# Patient Record
Sex: MALE | Race: WHITE | NOT HISPANIC OR LATINO
[De-identification: names, ages, dates, MRNs, and addresses within clinical notes are randomized per-mention and may not be internally consistent; named-entity substitution may affect disease eponyms.]

---

## 2018-02-06 ENCOUNTER — TRANSCRIPTION ENCOUNTER (OUTPATIENT)
Age: 63
End: 2018-02-06

## 2018-02-09 ENCOUNTER — APPOINTMENT (OUTPATIENT)
Dept: ORTHOPEDIC SURGERY | Facility: CLINIC | Age: 63
End: 2018-02-09
Payer: COMMERCIAL

## 2018-02-09 VITALS
DIASTOLIC BLOOD PRESSURE: 78 MMHG | WEIGHT: 180 LBS | HEART RATE: 94 BPM | HEIGHT: 72 IN | SYSTOLIC BLOOD PRESSURE: 116 MMHG | BODY MASS INDEX: 24.38 KG/M2

## 2018-02-09 DIAGNOSIS — M65.20 CALCIFIC TENDINITIS, UNSPECIFIED SITE: ICD-10-CM

## 2018-02-09 DIAGNOSIS — Z80.9 FAMILY HISTORY OF MALIGNANT NEOPLASM, UNSPECIFIED: ICD-10-CM

## 2018-02-09 DIAGNOSIS — M51.26 OTHER INTERVERTEBRAL DISC DISPLACEMENT, LUMBAR REGION: ICD-10-CM

## 2018-02-09 PROCEDURE — 73130 X-RAY EXAM OF HAND: CPT | Mod: 26,LT

## 2018-02-09 PROCEDURE — 99203 OFFICE O/P NEW LOW 30 MIN: CPT

## 2018-02-09 RX ORDER — KETOCONAZOLE 20 MG/G
2 CREAM TOPICAL
Qty: 30 | Refills: 0 | Status: ACTIVE | COMMUNITY
Start: 2017-08-17

## 2018-02-09 RX ORDER — SODIUM SULFATE, POTASSIUM SULFATE, MAGNESIUM SULFATE 17.5; 3.13; 1.6 G/ML; G/ML; G/ML
17.5-3.13-1.6 SOLUTION, CONCENTRATE ORAL
Qty: 354 | Refills: 0 | Status: ACTIVE | COMMUNITY
Start: 2017-08-15

## 2018-02-09 RX ORDER — NAPROXEN 500 MG/1
500 TABLET ORAL
Qty: 30 | Refills: 0 | Status: ACTIVE | COMMUNITY
Start: 2018-02-06

## 2018-02-09 RX ORDER — NAPROXEN 500 MG/1
TABLET ORAL
Refills: 0 | Status: ACTIVE | COMMUNITY

## 2018-02-09 RX ORDER — CLINDAMYCIN HYDROCHLORIDE 300 MG/1
300 CAPSULE ORAL
Qty: 20 | Refills: 0 | Status: ACTIVE | COMMUNITY
Start: 2018-01-09

## 2018-02-09 RX ORDER — NYSTATIN 100000 [USP'U]/G
100000 CREAM TOPICAL
Qty: 15 | Refills: 0 | Status: ACTIVE | COMMUNITY
Start: 2017-08-17

## 2018-04-07 ENCOUNTER — TRANSCRIPTION ENCOUNTER (OUTPATIENT)
Age: 63
End: 2018-04-07

## 2020-08-10 ENCOUNTER — APPOINTMENT (OUTPATIENT)
Dept: ORTHOPEDIC SURGERY | Facility: CLINIC | Age: 65
End: 2020-08-10
Payer: MEDICARE

## 2020-08-10 VITALS
HEART RATE: 83 BPM | WEIGHT: 181 LBS | HEIGHT: 72 IN | BODY MASS INDEX: 24.52 KG/M2 | DIASTOLIC BLOOD PRESSURE: 77 MMHG | SYSTOLIC BLOOD PRESSURE: 129 MMHG | TEMPERATURE: 97.4 F

## 2020-08-10 DIAGNOSIS — M23.91 UNSPECIFIED INTERNAL DERANGEMENT OF RIGHT KNEE: ICD-10-CM

## 2020-08-10 PROCEDURE — 99215 OFFICE O/P EST HI 40 MIN: CPT

## 2020-08-10 PROCEDURE — 73564 X-RAY EXAM KNEE 4 OR MORE: CPT | Mod: RT

## 2020-08-13 ENCOUNTER — OUTPATIENT (OUTPATIENT)
Dept: OUTPATIENT SERVICES | Facility: HOSPITAL | Age: 65
LOS: 1 days | End: 2020-08-13
Payer: MEDICARE

## 2020-08-13 ENCOUNTER — APPOINTMENT (OUTPATIENT)
Dept: MRI IMAGING | Facility: CLINIC | Age: 65
End: 2020-08-13
Payer: MEDICARE

## 2020-08-13 DIAGNOSIS — Z00.8 ENCOUNTER FOR OTHER GENERAL EXAMINATION: ICD-10-CM

## 2020-08-13 PROCEDURE — 73721 MRI JNT OF LWR EXTRE W/O DYE: CPT | Mod: 26,RT

## 2020-08-13 PROCEDURE — 73721 MRI JNT OF LWR EXTRE W/O DYE: CPT

## 2020-08-18 ENCOUNTER — APPOINTMENT (OUTPATIENT)
Dept: ORTHOPEDIC SURGERY | Facility: CLINIC | Age: 65
End: 2020-08-18
Payer: MEDICARE

## 2020-08-18 VITALS
HEIGHT: 72 IN | HEART RATE: 83 BPM | BODY MASS INDEX: 24.52 KG/M2 | TEMPERATURE: 97.7 F | DIASTOLIC BLOOD PRESSURE: 70 MMHG | WEIGHT: 181 LBS | SYSTOLIC BLOOD PRESSURE: 110 MMHG

## 2020-08-18 DIAGNOSIS — S80.01XA CONTUSION OF RIGHT KNEE, INITIAL ENCOUNTER: ICD-10-CM

## 2020-08-18 DIAGNOSIS — M84.453A PATHOLOGICAL FRACTURE, UNSPECIFIED FEMUR, INITIAL ENCOUNTER FOR FRACTURE: ICD-10-CM

## 2020-08-18 PROCEDURE — 99213 OFFICE O/P EST LOW 20 MIN: CPT

## 2020-08-29 ENCOUNTER — TRANSCRIPTION ENCOUNTER (OUTPATIENT)
Age: 65
End: 2020-08-29

## 2021-07-14 ENCOUNTER — TRANSCRIPTION ENCOUNTER (OUTPATIENT)
Age: 66
End: 2021-07-14

## 2021-07-17 ENCOUNTER — TRANSCRIPTION ENCOUNTER (OUTPATIENT)
Age: 66
End: 2021-07-17

## 2021-10-07 ENCOUNTER — TRANSCRIPTION ENCOUNTER (OUTPATIENT)
Age: 66
End: 2021-10-07

## 2023-01-26 ENCOUNTER — OFFICE (OUTPATIENT)
Dept: URBAN - METROPOLITAN AREA CLINIC 27 | Facility: CLINIC | Age: 68
Setting detail: OPHTHALMOLOGY
End: 2023-01-26
Payer: MEDICARE

## 2023-01-26 DIAGNOSIS — H35.373: ICD-10-CM

## 2023-01-26 DIAGNOSIS — H01.002: ICD-10-CM

## 2023-01-26 DIAGNOSIS — H01.005: ICD-10-CM

## 2023-01-26 PROCEDURE — 99213 OFFICE O/P EST LOW 20 MIN: CPT | Performed by: OPHTHALMOLOGY

## 2023-01-26 PROCEDURE — 92134 CPTRZ OPH DX IMG PST SGM RTA: CPT | Performed by: OPHTHALMOLOGY

## 2023-01-26 ASSESSMENT — AXIALLENGTH_DERIVED
OD_AL: 23.1912
OS_AL: 23.3782
OD_AL: 23.2857
OS_AL: 23.2356

## 2023-01-26 ASSESSMENT — REFRACTION_MANIFEST
OD_VA1: 20/20
OD_CYLINDER: +0.50
OS_AXIS: 120
OS_CYLINDER: +0.50
OD_AXIS: 10
OD_SPHERE: -0.50
OS_SPHERE: -0.50
OS_VA1: 20/20

## 2023-01-26 ASSESSMENT — SPHEQUIV_DERIVED
OD_SPHEQUIV: -0.25
OS_SPHEQUIV: -0.625
OD_SPHEQUIV: -0.5
OS_SPHEQUIV: -0.25

## 2023-01-26 ASSESSMENT — VISUAL ACUITY
OD_BCVA: 20/20
OS_BCVA: 20/20-1

## 2023-01-26 ASSESSMENT — REFRACTION_AUTOREFRACTION
OS_AXIS: 130
OS_CYLINDER: +0.25
OD_AXIS: 177
OD_SPHERE: -0.75
OD_CYLINDER: +0.50
OS_SPHERE: -0.75

## 2023-01-26 ASSESSMENT — KERATOMETRY
OD_AXISANGLE_DEGREES: 007
METHOD_AUTO_MANUAL: AUTO
OS_K2POWER_DIOPTERS: 44.75
OD_K1POWER_DIOPTERS: 44.75
OD_K2POWER_DIOPTERS: 45.00
OS_K1POWER_DIOPTERS: 44.75
OS_AXISANGLE_DEGREES: 090

## 2023-01-26 ASSESSMENT — TONOMETRY
OD_IOP_MMHG: 15
OS_IOP_MMHG: 17

## 2023-01-26 ASSESSMENT — CORNEAL EDEMA CLINICAL DESCRIPTION: OS_CORNEALEDEMA: ABSENT

## 2023-01-26 ASSESSMENT — LID EXAM ASSESSMENTS
OS_BLEPHARITIS: LLL 1+
OD_BLEPHARITIS: RLL 1+

## 2023-04-28 ENCOUNTER — NON-APPOINTMENT (OUTPATIENT)
Age: 68
End: 2023-04-28

## 2023-04-28 ENCOUNTER — APPOINTMENT (OUTPATIENT)
Dept: ORTHOPEDIC SURGERY | Facility: CLINIC | Age: 68
End: 2023-04-28
Payer: MEDICARE

## 2023-04-28 VITALS
WEIGHT: 184 LBS | HEIGHT: 72 IN | SYSTOLIC BLOOD PRESSURE: 149 MMHG | BODY MASS INDEX: 24.92 KG/M2 | DIASTOLIC BLOOD PRESSURE: 78 MMHG | HEART RATE: 76 BPM

## 2023-04-28 PROCEDURE — 99213 OFFICE O/P EST LOW 20 MIN: CPT

## 2023-04-28 PROCEDURE — 73630 X-RAY EXAM OF FOOT: CPT | Mod: LT

## 2023-04-28 NOTE — HISTORY OF PRESENT ILLNESS
[de-identified] : DIAMOND ADORNO  is a 67 year year old M who presents with left foot injury.  He says that last week Wednesday he was on vacation when he was running by the pool in order to didi and go on a way when he hit top of his left foot underneath a wooden panel.  He says that he had an abrasion and developed some swelling.  He was able to weight-bear though he did have a little bit of pain.  He says that the day after he went for a hike and was able to do the relatively strenuous hike without much difficulty and did not have much pain in the foot.  However after that he developed more pain in the foot.  He says that since Tuesday of this week he feels as if the pain in the foot has plateaued and he is not improving.  He has been taking about 4 Advil a day with some relief of the pain.  He also reports that initially the abrasion that he had he was putting antiseptic on it and it dried up, however over the past few days it has opened up again.\par

## 2023-04-28 NOTE — PHYSICAL EXAM
[de-identified] : General: No apparent distress\par Cardiovascular: extremities warm and well-perfused, no cyanosis\par Pulmonary: non labored respirations\par \par Musculoskeletal:\par \par Left foot:\par Skin: 3 cm superficial abrasion on the lateral dorsal aspect of the foot, no erythema or induration or expressible drainage\par \par Motor: 5/5 EHL/FHL/TA/GS\par Sensory: SILT s/s/sp/dp/t distributions\par Pulses: 2+ DP pulse\par ROM: \par Dorsiflexion: 20\par Plantarflexion: 35\par Eversion: No pain\par Inversion: No pain\par Tenderness: Tender to palpation surrounding abrasion, also tenderness palpation about the distal aspect of the fourth metatarsal [de-identified] : 3 views of the left foot obtained today and interpreted by me including AP, oblique, lateral views demonstrate no acute fracture or dislocation.  There are minimal degenerative changes about the foot.

## 2023-04-28 NOTE — DISCUSSION/SUMMARY
[de-identified] : Left foot injury, sprain versus bone bruise.  Discussed with him that since he was able to ambulate and hike after the injury, it is unlikely that he has a significant fracture or sprain that is not shown on his x-ray.  I recommend rest from higher impact activities such as long walking and running for the next few weeks in order to allow the foot to heal.  In addition in terms of the abrasion that he has, he should apply Neosporin to the area and change his Band-Aid twice a day.  Discussed with him that this should start to get better over the next week.  If he continues to have pain in the foot or issues with the abrasion, he may come back and see me in a few weeks before his upcoming trip.  Otherwise I will see him on an as-needed basis.  The patient expressed understanding of his diagnosis and treatment plan and all questions were answered.\par \par This note was generated using dragon medical dictation software.  A reasonable effort has been made for proofreading its contents, but typos may still remain.  If there are any questions or points of clarification needed please notify my office.

## 2023-05-01 ENCOUNTER — OFFICE (OUTPATIENT)
Dept: URBAN - METROPOLITAN AREA CLINIC 100 | Facility: CLINIC | Age: 68
Setting detail: OPHTHALMOLOGY
End: 2023-05-01
Payer: MEDICARE

## 2023-05-01 DIAGNOSIS — H01.005: ICD-10-CM

## 2023-05-01 DIAGNOSIS — H01.002: ICD-10-CM

## 2023-05-01 DIAGNOSIS — H00.11: ICD-10-CM

## 2023-05-01 PROCEDURE — 92285 EXTERNAL OCULAR PHOTOGRAPHY: CPT | Performed by: OPHTHALMOLOGY

## 2023-05-01 PROCEDURE — 99213 OFFICE O/P EST LOW 20 MIN: CPT | Performed by: OPHTHALMOLOGY

## 2023-05-01 ASSESSMENT — CORNEAL EDEMA CLINICAL DESCRIPTION: OS_CORNEALEDEMA: ABSENT

## 2023-05-01 ASSESSMENT — CONFRONTATIONAL VISUAL FIELD TEST (CVF)
OS_FINDINGS: FULL
OD_FINDINGS: FULL

## 2023-05-01 ASSESSMENT — VISUAL ACUITY
OS_BCVA: 20/40-1
OD_BCVA: 20/30+2

## 2023-05-01 ASSESSMENT — KERATOMETRY
OS_K1POWER_DIOPTERS: 44.75
OS_K2POWER_DIOPTERS: 44.75
OD_K1POWER_DIOPTERS: 44.75
OS_AXISANGLE_DEGREES: 090
OD_AXISANGLE_DEGREES: 007
OD_K2POWER_DIOPTERS: 45.00
METHOD_AUTO_MANUAL: AUTO

## 2023-05-01 ASSESSMENT — REFRACTION_AUTOREFRACTION
OS_CYLINDER: +0.25
OD_SPHERE: -0.75
OD_CYLINDER: +0.50
OD_AXIS: 177
OS_AXIS: 130
OS_SPHERE: -0.75

## 2023-05-01 ASSESSMENT — SPHEQUIV_DERIVED
OS_SPHEQUIV: -0.625
OD_SPHEQUIV: -0.5

## 2023-05-01 ASSESSMENT — AXIALLENGTH_DERIVED
OD_AL: 23.2857
OS_AL: 23.3782

## 2023-05-01 ASSESSMENT — LID EXAM ASSESSMENTS
OS_BLEPHARITIS: LLL 1+
OD_BLEPHARITIS: RLL 1+

## 2023-05-08 ENCOUNTER — OFFICE (OUTPATIENT)
Dept: URBAN - METROPOLITAN AREA CLINIC 100 | Facility: CLINIC | Age: 68
Setting detail: OPHTHALMOLOGY
End: 2023-05-08
Payer: MEDICARE

## 2023-05-08 ENCOUNTER — RX ONLY (RX ONLY)
Age: 68
End: 2023-05-08

## 2023-05-08 DIAGNOSIS — H00.11: ICD-10-CM

## 2023-05-08 PROCEDURE — 67800 REMOVE EYELID LESION: CPT | Performed by: OPHTHALMOLOGY

## 2023-05-08 ASSESSMENT — KERATOMETRY
OD_K1POWER_DIOPTERS: 44.75
OS_K2POWER_DIOPTERS: 44.75
OS_AXISANGLE_DEGREES: 090
METHOD_AUTO_MANUAL: AUTO
OS_K1POWER_DIOPTERS: 44.75
OD_K2POWER_DIOPTERS: 45.00
OD_AXISANGLE_DEGREES: 007

## 2023-05-08 ASSESSMENT — CONFRONTATIONAL VISUAL FIELD TEST (CVF)
OD_FINDINGS: FULL
OS_FINDINGS: FULL

## 2023-05-08 ASSESSMENT — LID EXAM ASSESSMENTS
OS_BLEPHARITIS: LLL 1+
OD_BLEPHARITIS: RLL 1+

## 2023-05-08 ASSESSMENT — SPHEQUIV_DERIVED
OS_SPHEQUIV: -0.625
OD_SPHEQUIV: -0.5

## 2023-05-08 ASSESSMENT — REFRACTION_AUTOREFRACTION
OS_SPHERE: -0.75
OD_AXIS: 177
OD_SPHERE: -0.75
OD_CYLINDER: +0.50
OS_AXIS: 130
OS_CYLINDER: +0.25

## 2023-05-08 ASSESSMENT — CORNEAL EDEMA CLINICAL DESCRIPTION: OS_CORNEALEDEMA: ABSENT

## 2023-05-08 ASSESSMENT — AXIALLENGTH_DERIVED
OS_AL: 23.3782
OD_AL: 23.2857

## 2023-05-08 ASSESSMENT — VISUAL ACUITY
OS_BCVA: 20/25
OD_BCVA: 20/30

## 2023-06-14 ENCOUNTER — OFFICE (OUTPATIENT)
Dept: URBAN - METROPOLITAN AREA CLINIC 27 | Facility: CLINIC | Age: 68
Setting detail: OPHTHALMOLOGY
End: 2023-06-14
Payer: MEDICARE

## 2023-06-14 DIAGNOSIS — H01.005: ICD-10-CM

## 2023-06-14 DIAGNOSIS — H00.11: ICD-10-CM

## 2023-06-14 DIAGNOSIS — H01.002: ICD-10-CM

## 2023-06-14 PROCEDURE — 99213 OFFICE O/P EST LOW 20 MIN: CPT | Performed by: OPHTHALMOLOGY

## 2023-06-14 PROCEDURE — 11900 INJECT SKIN LESIONS </W 7: CPT | Performed by: OPHTHALMOLOGY

## 2023-06-14 ASSESSMENT — AXIALLENGTH_DERIVED
OS_AL: 23.3782
OD_AL: 23.2857

## 2023-06-14 ASSESSMENT — CONFRONTATIONAL VISUAL FIELD TEST (CVF)
OD_FINDINGS: FULL
OS_FINDINGS: FULL

## 2023-06-14 ASSESSMENT — VISUAL ACUITY
OS_BCVA: 20/20-1
OD_BCVA: 20/25

## 2023-06-14 ASSESSMENT — LID EXAM ASSESSMENTS
OD_BLEPHARITIS: RLL 1+
OS_BLEPHARITIS: LLL 1+

## 2023-06-14 ASSESSMENT — SPHEQUIV_DERIVED
OS_SPHEQUIV: -0.625
OD_SPHEQUIV: -0.5

## 2023-06-14 ASSESSMENT — KERATOMETRY
METHOD_AUTO_MANUAL: AUTO
OS_K2POWER_DIOPTERS: 44.75
OD_K1POWER_DIOPTERS: 44.75
OD_K2POWER_DIOPTERS: 45.00
OS_AXISANGLE_DEGREES: 090
OD_AXISANGLE_DEGREES: 007
OS_K1POWER_DIOPTERS: 44.75

## 2023-06-14 ASSESSMENT — CORNEAL EDEMA CLINICAL DESCRIPTION: OS_CORNEALEDEMA: ABSENT

## 2023-06-14 ASSESSMENT — REFRACTION_AUTOREFRACTION
OS_CYLINDER: +0.25
OS_AXIS: 130
OD_SPHERE: -0.75
OS_SPHERE: -0.75
OD_AXIS: 177
OD_CYLINDER: +0.50

## 2023-06-23 ENCOUNTER — OFFICE (OUTPATIENT)
Dept: URBAN - METROPOLITAN AREA CLINIC 35 | Facility: CLINIC | Age: 68
Setting detail: OPHTHALMOLOGY
End: 2023-06-23
Payer: MEDICARE

## 2023-06-23 DIAGNOSIS — H00.11: ICD-10-CM

## 2023-06-23 PROCEDURE — 67800 REMOVE EYELID LESION: CPT | Performed by: OPHTHALMOLOGY

## 2023-06-23 ASSESSMENT — KERATOMETRY
OS_K1POWER_DIOPTERS: 44.75
METHOD_AUTO_MANUAL: AUTO
OS_AXISANGLE_DEGREES: 090
OD_K1POWER_DIOPTERS: 44.75
OD_AXISANGLE_DEGREES: 007
OS_K2POWER_DIOPTERS: 44.75
OD_K2POWER_DIOPTERS: 45.00

## 2023-06-23 ASSESSMENT — REFRACTION_AUTOREFRACTION
OD_CYLINDER: +0.50
OS_AXIS: 130
OS_CYLINDER: +0.25
OD_AXIS: 177
OS_SPHERE: -0.75
OD_SPHERE: -0.75

## 2023-06-23 ASSESSMENT — AXIALLENGTH_DERIVED
OD_AL: 23.2857
OS_AL: 23.3782

## 2023-06-23 ASSESSMENT — LID EXAM ASSESSMENTS
OS_BLEPHARITIS: LLL 1+
OD_BLEPHARITIS: RLL 1+

## 2023-06-23 ASSESSMENT — VISUAL ACUITY
OD_BCVA: 20/25
OS_BCVA: 20/20-1

## 2023-06-23 ASSESSMENT — SPHEQUIV_DERIVED
OS_SPHEQUIV: -0.625
OD_SPHEQUIV: -0.5

## 2023-06-23 ASSESSMENT — CONFRONTATIONAL VISUAL FIELD TEST (CVF)
OS_FINDINGS: FULL
OD_FINDINGS: FULL

## 2023-06-23 ASSESSMENT — CORNEAL EDEMA CLINICAL DESCRIPTION: OS_CORNEALEDEMA: ABSENT

## 2023-10-31 ENCOUNTER — APPOINTMENT (OUTPATIENT)
Dept: ORTHOPEDIC SURGERY | Facility: CLINIC | Age: 68
End: 2023-10-31
Payer: MEDICARE

## 2023-10-31 VITALS — HEART RATE: 85 BPM | SYSTOLIC BLOOD PRESSURE: 141 MMHG | DIASTOLIC BLOOD PRESSURE: 81 MMHG

## 2023-10-31 DIAGNOSIS — M79.671 PAIN IN RIGHT FOOT: ICD-10-CM

## 2023-10-31 PROCEDURE — 99214 OFFICE O/P EST MOD 30 MIN: CPT

## 2023-10-31 PROCEDURE — 73630 X-RAY EXAM OF FOOT: CPT | Mod: RT

## 2023-10-31 RX ORDER — MELOXICAM 15 MG/1
15 TABLET ORAL DAILY
Qty: 30 | Refills: 0 | Status: ACTIVE | COMMUNITY
Start: 2023-10-31 | End: 1900-01-01

## 2023-11-07 ENCOUNTER — OFFICE (OUTPATIENT)
Dept: URBAN - METROPOLITAN AREA CLINIC 27 | Facility: CLINIC | Age: 68
Setting detail: OPHTHALMOLOGY
End: 2023-11-07
Payer: MEDICARE

## 2023-11-07 DIAGNOSIS — H01.002: ICD-10-CM

## 2023-11-07 DIAGNOSIS — H35.412: ICD-10-CM

## 2023-11-07 DIAGNOSIS — H43.393: ICD-10-CM

## 2023-11-07 DIAGNOSIS — H43.811: ICD-10-CM

## 2023-11-07 DIAGNOSIS — H35.373: ICD-10-CM

## 2023-11-07 DIAGNOSIS — H35.89: ICD-10-CM

## 2023-11-07 DIAGNOSIS — H31.011: ICD-10-CM

## 2023-11-07 PROCEDURE — 92014 COMPRE OPH EXAM EST PT 1/>: CPT | Performed by: OPHTHALMOLOGY

## 2023-11-07 PROCEDURE — 92134 CPTRZ OPH DX IMG PST SGM RTA: CPT | Performed by: OPHTHALMOLOGY

## 2023-11-07 ASSESSMENT — SPHEQUIV_DERIVED
OS_SPHEQUIV: -0.5
OD_SPHEQUIV: -0.125

## 2023-11-07 ASSESSMENT — LID EXAM ASSESSMENTS
OD_BLEPHARITIS: RLL 1+
OS_BLEPHARITIS: LLL 1+

## 2023-11-07 ASSESSMENT — CONFRONTATIONAL VISUAL FIELD TEST (CVF)
OD_FINDINGS: FULL
OS_FINDINGS: FULL

## 2023-11-07 ASSESSMENT — REFRACTION_AUTOREFRACTION
OD_SPHERE: -0.50
OS_AXIS: 163
OD_CYLINDER: +0.75
OD_AXIS: 007
OS_CYLINDER: +0.50
OS_SPHERE: -0.75

## 2024-01-08 ENCOUNTER — APPOINTMENT (OUTPATIENT)
Dept: ORTHOPEDIC SURGERY | Facility: CLINIC | Age: 69
End: 2024-01-08
Payer: MEDICARE

## 2024-01-08 PROCEDURE — 99214 OFFICE O/P EST MOD 30 MIN: CPT | Mod: 25

## 2024-01-08 PROCEDURE — 20600 DRAIN/INJ JOINT/BURSA W/O US: CPT | Mod: RT

## 2024-01-08 RX ORDER — LIDOCAINE HYDROCHLORIDE 10 MG/ML
1 INJECTION, SOLUTION INFILTRATION; PERINEURAL
Refills: 0 | Status: COMPLETED | OUTPATIENT
Start: 2024-01-08

## 2024-01-08 RX ORDER — METHYLPRED ACET/NACL,ISO-OS/PF 40 MG/ML
40 VIAL (ML) INJECTION
Qty: 1 | Refills: 0 | Status: COMPLETED | OUTPATIENT
Start: 2024-01-08

## 2024-01-08 RX ADMIN — METHYLPREDNISOLONE ACETATE 1 MG/ML: 40 INJECTION, SUSPENSION INTRA-ARTICULAR; INTRALESIONAL; INTRAMUSCULAR; SOFT TISSUE at 00:00

## 2024-01-08 RX ADMIN — LIDOCAINE HYDROCHLORIDE 1 %: 10 INJECTION, SOLUTION EPIDURAL; INFILTRATION; INTRACAUDAL; PERINEURAL at 00:00

## 2024-01-08 NOTE — DISCUSSION/SUMMARY
[de-identified] : This patient presents today for follow-up regarding right foot metatarsalgia of the fifth ray.  He is noting continued pain despite meloxicam and modification of activities.  He continues to have soreness and discomfort when I palpate the plantar aspect of the fifth metatarsal head.  I discussed further treatment options and recommended a steroid injection.  On today's visit we injected the plantar aspect of the metatarsal phalangeal joint of the fifth ray with Depo-Medrol lidocaine.  Instructions given postinjection for ice analgesics and modification of activities.  If the symptoms do not improve I would recommend an MRI of the foot and evaluation by a foot and ankle specialist.  At least 30 minutes was spent performing the evaluation and management on today's office visit.  This includes but is not limited to preparing to see patient including review of any test results or outside medical records, obtaining and/or reviewing separately obtained history, performing examination and evaluation, counseling and educating the patient on their diagnosis and treatment recommendations, ordering medications, tests, or procedures, documenting clinical information in the electronic health record, independently interpreting results (not separately reported) and communicating results to the patient, and coordination of care.

## 2024-01-08 NOTE — PHYSICAL EXAM
[de-identified] : The patient appears well nourished  and in no apparent distress.  The patient is alert and oriented to person, place, and time.   Affect and mood appear normal.    The head is normocephalic and atraumatic.  The eyes reveal normal sclera and extra ocular muscles are intact.   The neck appears normal with no jugular venous distention or masses noted.   Skin shows normal turgor with no evidence of eczema or psoriasis.  No respiratory distress noted.  The patient ambulates with an antalgic gait.  Exam of the right foot reveals there is normal range of motion about the foot, hindfoot, and toes. There is tenderness to palpation about the plantar aspect of the fifth metatarsal head.  There is also callus formation noted over the plantar aspect of the metatarsal head.  There is no soft tissue swelling noted. There is no eccyhmosis.  There is no warmth or erythema. Flexor and extensor tendons are intact. Pulses are intact.  Capillary refill is good.   Strength and sensation are normal.  There is no lymphadenopathy.

## 2024-01-08 NOTE — HISTORY OF PRESENT ILLNESS
[4] : a current pain level of 4/10 [5] : a maximum pain level of 5/10 [NSAIDs] : relieved by nonsteroidal anti-inflammatory drugs [de-identified] : This patient presents for follow-up regarding metatarsalgia of the right fifth metatarsal.  He is noting improvement after the meloxicam and reduced activities but still has discomfort 4-5 out of 10 with prolonged ambulation and stair climbing.  He denies any radicular pain.  Denies any numbness and tingling.  Pain localized to the plantar aspect of the fifth metatarsal head. [de-identified] : meloxicam

## 2024-01-08 NOTE — PROCEDURE
[de-identified] : On today's visit under sterile conditions we injected 1 cc Depo-Medrol and 1 cc lidocaine to the plantar aspect of the fifth metatarsal phalangeal joint.  Instructions given postinjection for ice analgesics and modification of activities.

## 2024-01-22 ENCOUNTER — APPOINTMENT (OUTPATIENT)
Dept: MRI IMAGING | Facility: IMAGING CENTER | Age: 69
End: 2024-01-22
Payer: MEDICARE

## 2024-01-22 ENCOUNTER — OUTPATIENT (OUTPATIENT)
Dept: OUTPATIENT SERVICES | Facility: HOSPITAL | Age: 69
LOS: 1 days | End: 2024-01-22
Payer: MEDICARE

## 2024-01-22 ENCOUNTER — RESULT REVIEW (OUTPATIENT)
Age: 69
End: 2024-01-22

## 2024-01-22 DIAGNOSIS — M77.41 METATARSALGIA, RIGHT FOOT: ICD-10-CM

## 2024-01-22 DIAGNOSIS — M79.671 PAIN IN RIGHT FOOT: ICD-10-CM

## 2024-01-22 PROCEDURE — 73718 MRI LOWER EXTREMITY W/O DYE: CPT

## 2024-01-22 PROCEDURE — 73718 MRI LOWER EXTREMITY W/O DYE: CPT | Mod: 26,RT,MH

## 2024-01-29 ENCOUNTER — APPOINTMENT (OUTPATIENT)
Dept: ORTHOPEDIC SURGERY | Facility: CLINIC | Age: 69
End: 2024-01-29
Payer: MEDICARE

## 2024-01-29 VITALS — DIASTOLIC BLOOD PRESSURE: 70 MMHG | HEART RATE: 80 BPM | SYSTOLIC BLOOD PRESSURE: 109 MMHG

## 2024-01-29 PROCEDURE — 99213 OFFICE O/P EST LOW 20 MIN: CPT

## 2024-01-29 NOTE — DISCUSSION/SUMMARY
[de-identified] : This patient presents today for follow-up regarding metatarsalgia of the right foot.  Patient continues to have pain about the plantar aspect of the metatarsal head of the fifth ray.  His MRI shows evidence of some possible bursitis in the area.  He did not respond favorably to the cortisone injection previously.  Due to this continued symptoms I recommended evaluation by Dr. Derian Lee who is a foot and ankle specialist.  He was given Dr. Lee's card and I will see him sometime in the near future.  At least 20 minutes was spent performing the evaluation and management on today's office visit.  This includes but is not limited to preparing to see patient including review of any test results or outside medical records, obtaining and/or reviewing separately obtained history, performing examination and evaluation, counseling and educating the patient on their diagnosis and treatment recommendations, ordering medications, tests, or procedures, documenting clinical information in the electronic health record, independently interpreting results (not separately reported) and communicating results to the patient, and coordination of care.

## 2024-01-29 NOTE — REASON FOR VISIT
[Follow-Up Visit] : a follow-up visit for [FreeTextEntry2] : metatarsalgia of right foot; here for MRI review.

## 2024-01-29 NOTE — PHYSICAL EXAM
[de-identified] : The patient appears well nourished  and in no apparent distress.  The patient is alert and oriented to person, place, and time.   Affect and mood appear normal.    The head is normocephalic and atraumatic.  The eyes reveal normal sclera and extra ocular muscles are intact.   The neck appears normal with no jugular venous distention or masses noted.   Skin shows normal turgor with no evidence of eczema or psoriasis.  No respiratory distress noted.  The patient ambulates with an antalgic gait.  Exam of the right foot reveals there is normal range of motion about the foot, hindfoot, and toes. There is tenderness to palpation about the plantar aspect of the fifth metatarsal head.  There is also callus formation noted over the plantar aspect of the metatarsal head.  There is no soft tissue swelling noted. There is no eccyhmosis.  There is no warmth or erythema. Flexor and extensor tendons are intact. Pulses are intact.  Capillary refill is good.   Strength and sensation are normal.  There is no lymphadenopathy.     [de-identified] : The MRI of the foot was reviewed.  There is no evidence of any stress fracture of the metatarsal head.  There is evidence of some possible adventitial bursitis beneath the plantar aspect of the fifth metatarsal head.

## 2024-01-29 NOTE — HISTORY OF PRESENT ILLNESS
[de-identified] : This patient presents today for follow-up and to review the MRI results of the right foot.  He continues to complain of pain about the plantar aspect of the fifth metatarsal head.  He did have an injection of Depo-Medrol lidocaine on last office visit which she feels did not help him.  He presents today for follow-up and to review the MRI results.  Tubes are currently unchanged with significant pain over the fifth metatarsal head on the plantar aspect with ambulation.

## 2024-01-30 ENCOUNTER — APPOINTMENT (OUTPATIENT)
Dept: ORTHOPEDIC SURGERY | Facility: CLINIC | Age: 69
End: 2024-01-30
Payer: MEDICARE

## 2024-01-30 VITALS — HEIGHT: 72 IN | WEIGHT: 184 LBS | BODY MASS INDEX: 24.92 KG/M2

## 2024-01-30 DIAGNOSIS — M77.41 METATARSALGIA, RIGHT FOOT: ICD-10-CM

## 2024-01-30 DIAGNOSIS — L84 CORNS AND CALLOSITIES: ICD-10-CM

## 2024-01-30 PROCEDURE — 73630 X-RAY EXAM OF FOOT: CPT | Mod: RT

## 2024-01-30 PROCEDURE — 99203 OFFICE O/P NEW LOW 30 MIN: CPT

## 2024-01-30 NOTE — ASSESSMENT
[FreeTextEntry1] : pain likley related to callous rec dpm care for callous removal, possible orthotics -- contact info given advised against 5th mt head ostectomy w/o trying other means first supportive shoe f/up prn

## 2024-01-30 NOTE — HISTORY OF PRESENT ILLNESS
[7] : 7 [3] : 3 [Dull/Aching] : dull/aching [Sharp] : sharp [de-identified] : 01/30/2024:  4 wks lateral foot pain. may have stepped on acorn preceeding it. saw dr gibson who tx w/ mobic and csi 2 wks ago w/o relief. no prior foot probs. denies dm/tob. retired. denies n/t  [] : no [FreeTextEntry1] : right foot [de-identified] : [facility] [de-identified] : Dr. Estrada [de-identified] : Xray, MRI

## 2024-01-30 NOTE — DATA REVIEWED
[MRI] : MRI [Right] : of the right [Foot] : foot [I reviewed the films/CD and additionally noted] : I reviewed the films/CD and additionally noted [FreeTextEntry1] : plantar 5th mt head callous

## 2024-01-30 NOTE — PHYSICAL EXAM
[Right] : right foot and ankle [NL (40)] : plantar flexion 40 degrees [NL 30)] : inversion 30 degrees [NL (20)] : eversion 20 degrees [5___] : eversion 5[unfilled]/5 [2+] : dorsalis pedis pulse: 2+ [] : patient ambulates without assistive device [FreeTextEntry3] : plantar 5th mt head callous [FreeTextEntry8] : ttp at callous no bony ttp

## 2024-06-28 ENCOUNTER — OFFICE (OUTPATIENT)
Dept: URBAN - METROPOLITAN AREA CLINIC 35 | Facility: CLINIC | Age: 69
Setting detail: OPHTHALMOLOGY
End: 2024-06-28
Payer: MEDICARE

## 2024-06-28 DIAGNOSIS — H01.005: ICD-10-CM

## 2024-06-28 DIAGNOSIS — H01.002: ICD-10-CM

## 2024-06-28 DIAGNOSIS — H00.15: ICD-10-CM

## 2024-06-28 PROCEDURE — 92285 EXTERNAL OCULAR PHOTOGRAPHY: CPT | Performed by: OPHTHALMOLOGY

## 2024-06-28 PROCEDURE — 99213 OFFICE O/P EST LOW 20 MIN: CPT | Performed by: OPHTHALMOLOGY

## 2024-06-28 ASSESSMENT — LID EXAM ASSESSMENTS
OS_BLEPHARITIS: LLL 1+
OD_BLEPHARITIS: RLL 1+

## 2024-06-28 ASSESSMENT — CONFRONTATIONAL VISUAL FIELD TEST (CVF)
OD_FINDINGS: FULL
OS_FINDINGS: FULL